# Patient Record
Sex: FEMALE | Race: WHITE | NOT HISPANIC OR LATINO | Employment: OTHER | URBAN - METROPOLITAN AREA
[De-identification: names, ages, dates, MRNs, and addresses within clinical notes are randomized per-mention and may not be internally consistent; named-entity substitution may affect disease eponyms.]

---

## 2023-09-26 DIAGNOSIS — N18.32 CHRONIC KIDNEY DISEASE, STAGE 3B: Primary | ICD-10-CM

## 2023-09-26 DIAGNOSIS — N18.32 STAGE 3B CHRONIC KIDNEY DISEASE: Primary | ICD-10-CM

## 2023-09-26 RX ORDER — ESTRADIOL 2 MG/1
2 TABLET ORAL DAILY
COMMUNITY
Start: 2023-08-23

## 2023-09-26 RX ORDER — TIZANIDINE 4 MG/1
TABLET ORAL 2 TIMES DAILY
COMMUNITY
Start: 2023-09-19

## 2023-09-26 RX ORDER — OXYCODONE HYDROCHLORIDE 20 MG/1
20 TABLET ORAL
COMMUNITY
Start: 2023-09-19

## 2023-09-26 RX ORDER — ONDANSETRON 4 MG/1
4 TABLET, ORALLY DISINTEGRATING ORAL EVERY 6 HOURS PRN
COMMUNITY
Start: 2023-09-15

## 2023-09-26 RX ORDER — LINACLOTIDE 290 UG/1
290 CAPSULE, GELATIN COATED ORAL
COMMUNITY
Start: 2023-09-19

## 2023-09-26 RX ORDER — PREDNISONE 20 MG/1
20 TABLET ORAL
COMMUNITY
Start: 2023-09-15

## 2023-09-29 RX ORDER — NITROGLYCERIN 0.4 MG/1
0.4 TABLET SUBLINGUAL
COMMUNITY
Start: 2023-08-24

## 2023-09-29 RX ORDER — AMLODIPINE BESYLATE 10 MG/1
10 TABLET ORAL DAILY
COMMUNITY
Start: 2023-09-12

## 2023-09-29 RX ORDER — ALLOPURINOL 100 MG/1
100 TABLET ORAL DAILY
COMMUNITY
Start: 2023-05-23

## 2023-09-29 RX ORDER — DICLOFENAC SODIUM 10 MG/G
2 GEL TOPICAL
COMMUNITY
Start: 2023-09-19

## 2023-09-29 RX ORDER — EVOLOCUMAB 140 MG/ML
1 INJECTION, SOLUTION SUBCUTANEOUS
COMMUNITY
Start: 2023-08-25

## 2023-09-29 RX ORDER — CARVEDILOL 3.12 MG/1
3.12 TABLET ORAL 2 TIMES DAILY
COMMUNITY
Start: 2023-04-14

## 2023-09-29 RX ORDER — VALACYCLOVIR HYDROCHLORIDE 500 MG/1
500 TABLET, FILM COATED ORAL DAILY
COMMUNITY
Start: 2023-05-05

## 2023-09-29 RX ORDER — PRASUGREL 10 MG/1
10 TABLET, FILM COATED ORAL DAILY
COMMUNITY
Start: 2023-09-02

## 2023-09-29 NOTE — PROGRESS NOTES
"Pt Name:  Katelin Barajas  Pt :  1965  Pt MRN:  [id number]    Date: 10/3/2023    Reason for visit:   Katelin Barajas is a 58 y.o. c female presenting for initial evaluation of CKD with serum creatinine 1.37, eGFR 45 and Chronic Kidney Disease Stage 3a.     Chief Complaint:   The patient denies any complaints today.    HPI:  The patient is being seen today for initial evaluation of CKD  and was referred by Dr. Christie.  The patient reports 25 + year history of Hypertension.  She does not have diabetes. She had a nephrectomy due to "stagnant urine growing around the kidney - encapsulated". She had the nephrectomy - right - at age 18.  She has been followed by Dr. Cabrales, Nephrology in [place] for approximately the past 10 years and wanted to be seen closer to home. The patient denies weakness, poor appetite, metallic taste, cramping, chest pain, palpitations, orthopnea, PND, trouble concentrating.  She c/o fatigue, edema, nausea, SOB - with exertion, decreased urination.  She took her  husbands Lasix 40 mg daily for the last 3 days due to lower extremity edema.     She does not monitor her Bps at home. The patient is following a low sodium diet. The patient is avoiding NSAIDs. The patient is drinking 64 - 72 oz of water daily.       History:   Past Medical History:   Diagnosis Date    Arthritis     Breast nodule     Chronic renal failure     LISE (generalized anxiety disorder)     Hypertension     Hypothyroidism, unspecified     Mixed hyperlipidemia     Solitary kidney, acquired      Past Surgical History:   Procedure Laterality Date    BLADDER SURGERY      CERVICAL FUSION      x2    HIP REPLACEMENT ARTHROPLASTY Bilateral     KIDNEY SURGERY      LUMBAR FUSION      x3    LUMBAR LAMINECTOMY      right nephrectomy      TOTAL ABDOMINAL HYSTERECTOMY W/ BILATERAL SALPINGOOPHORECTOMY      TOTAL SHOULDER ARTHROPLASTY Bilateral      History reviewed. No pertinent family history.  Social History " "    Substance and Sexual Activity   Alcohol Use Never     Social History     Substance and Sexual Activity   Drug Use Never     Social History     Substance and Sexual Activity   Sexual Activity Not Currently     reports that she is not currently sexually active.  Social History     Tobacco Use   Smoking Status Every Day    Types: Cigarettes   Smokeless Tobacco Never       Allergies:  Review of patient's allergies indicates:   Allergen Reactions    Coumadin [warfarin]     Lisinopril Other (See Comments)     Cough     Plavix [clopidogrel]     Adhesive Itching and Rash     "blisters under Tegaderm"          Current Outpatient Medications:     allopurinoL (ZYLOPRIM) 100 MG tablet, Take 100 mg by mouth once daily., Disp: , Rfl:     amLODIPine (NORVASC) 10 MG tablet, Take 10 mg by mouth once daily., Disp: , Rfl:     carvediloL (COREG) 3.125 MG tablet, Take 3.125 mg by mouth 2 (two) times daily., Disp: , Rfl:     diclofenac sodium (VOLTAREN) 1 % Gel, Apply 2 g topically as needed., Disp: , Rfl:     estradioL (ESTRACE) 2 MG tablet, Take 2 mg by mouth once daily., Disp: , Rfl:     LINZESS 290 mcg Cap capsule, Take 290 mcg by mouth as needed., Disp: , Rfl:     nitroGLYCERIN (NITROSTAT) 0.4 MG SL tablet, Place 0.4 mg under the tongue as needed., Disp: , Rfl:     ondansetron (ZOFRAN-ODT) 4 MG TbDL, Take 4 mg by mouth every 6 (six) hours as needed., Disp: , Rfl:     oxyCODONE (ROXICODONE) 20 mg Tab immediate release tablet, Take 20 mg by mouth as needed., Disp: , Rfl:     prasugreL (EFFIENT) 10 mg Tab, Take 10 mg by mouth once daily., Disp: , Rfl:     predniSONE (DELTASONE) 20 MG tablet, Take 20 mg by mouth as needed., Disp: , Rfl:     REPATHA SYRINGE 140 mg/mL Syrg, Inject 1 mL into the skin every 30 days., Disp: , Rfl:     tiZANidine (ZANAFLEX) 4 MG tablet, Take by mouth 2 (two) times daily., Disp: , Rfl:     valACYclovir (VALTREX) 500 MG tablet, Take 500 mg by mouth once daily., Disp: , Rfl:     cholecalciferol, vitamin D3, " "(VITAMIN D3) 25 mcg (1,000 unit) capsule, Take 5 capsules (5,000 Units total) by mouth once daily., Disp: 90 capsule, Rfl: 3    empagliflozin (JARDIANCE) 10 mg tablet, Take 1 tablet (10 mg total) by mouth once daily., Disp: 90 tablet, Rfl: 3    furosemide (LASIX) 40 MG tablet, Take 1 tablet (40 mg total) by mouth once daily., Disp: 90 tablet, Rfl: 3    [START ON 10/4/2023] patiromer calcium sorbitex (VELTASSA) 8.4 gram PwPk, Take 1 packet (8.4 g total) by mouth every Mon, Wed, Fri., Disp: 15 packet, Rfl: 11    valsartan (DIOVAN) 40 MG tablet, Take 0.5 tablets (20 mg total) by mouth once daily., Disp: 45 tablet, Rfl: 3    ROS:  Review of Systems   Constitutional:  Positive for fatigue. Negative for activity change and appetite change.   HENT:  Negative for hearing loss.    Eyes:  Negative for visual disturbance.   Respiratory:  Positive for shortness of breath. Negative for wheezing.    Cardiovascular:  Positive for leg swelling. Negative for chest pain.   Gastrointestinal:  Positive for nausea. Negative for abdominal pain, diarrhea and vomiting.   Genitourinary:  Positive for decreased urine volume. Negative for dysuria, flank pain, frequency and urgency.   Neurological:  Negative for dizziness, weakness and headaches.       Physical Exam:   Vitals:   Vitals:    10/03/23 1001   BP: 129/86   Pulse: 68   SpO2: 99%   Weight: 68 kg (150 lb)   Height: 5' 3" (1.6 m)     Body mass index is 26.57 kg/m².    Physical Exam  Vitals reviewed.   Constitutional:       General: She is not in acute distress.     Appearance: Normal appearance.   HENT:      Head: Normocephalic and atraumatic.      Mouth/Throat:      Mouth: Mucous membranes are moist.   Eyes:      Extraocular Movements: Extraocular movements intact.      Pupils: Pupils are equal, round, and reactive to light.   Cardiovascular:      Rate and Rhythm: Normal rate and regular rhythm.      Heart sounds: No murmur heard.  Pulmonary:      Effort: Pulmonary effort is normal. "      Breath sounds: No wheezing, rhonchi or rales.   Musculoskeletal:         General: No swelling.      Right lower le+ Pitting Edema present.      Left lower le+ Pitting Edema present.   Skin:     General: Skin is warm and dry.   Neurological:      Mental Status: She is alert and oriented to person, place, and time.   Psychiatric:         Mood and Affect: Mood normal.         Behavior: Behavior normal.       Labs/Tests 10/2/23:      K 5.1  Cl 107  C02 26  Glu 81  BUN 33  Creat 1.27  CA 9.6  Phos 2.8  Alb 4.2        Hgb 11.5    Vit D 18.2    Urine Prot/Creat  800 mg     Renal US 10/2/23:  Impression:   An isoechoic 10 mm nodule is noted adherent to the posterior bladder wall on the left side of the UVJ level.  No hydronephrosis.  Neoplasm is not excluded.       Diagnosis:  Plan and Assessment:  1. Hypertension with impaired renal function  Assessment & Plan:  At goal, Monitor BP twice daily for 2 weeks and return readings, 2 Gram NA diet, Continue Coreg 3.125 mg po BID, Decrease Amlodipine to 10 mg po daily, Start Valsartan 40 mg (1/2 type / 20 mg) po daily, Start lasix 40 mg po daily (took a dose of her  husbands for 3 days prior to visit due to Lower extremity edema).     Orders:  -     furosemide (LASIX) 40 MG tablet; Take 1 tablet (40 mg total) by mouth once daily.  Dispense: 90 tablet; Refill: 3  -     Renal Function Panel; Future; Expected date: 2024    2. Solitary kidney, acquired  Overview:  Right Nephrectomy     Assessment & Plan:  Solitary kidney -  Right nephrectomy at age 18     Orders:  -     valsartan (DIOVAN) 40 MG tablet; Take 0.5 tablets (20 mg total) by mouth once daily.  Dispense: 45 tablet; Refill: 3  -     patiromer calcium sorbitex (VELTASSA) 8.4 gram PwPk; Take 1 packet (8.4 g total) by mouth every Mon, Wed, Fri.  Dispense: 15 packet; Refill: 11  -     empagliflozin (JARDIANCE) 10 mg tablet; Take 1 tablet (10 mg total) by mouth once daily.  Dispense: 90  tablet; Refill: 3  -     Renal Function Panel; Future; Expected date: 02/02/2024    3. Stage 3a chronic kidney disease  Assessment & Plan:  Serum Creatinine 1.37 / eGFR 45; - without Anemia -  Avoid NSAIDS, Assure 72 oz of water daily, Meds per med list above     Have discussed futility of renal biopsy and she declines at this time.     She has not been on an ACE (allergy) or ARB - will start ARB and SGLT2     Orders:  -     valsartan (DIOVAN) 40 MG tablet; Take 0.5 tablets (20 mg total) by mouth once daily.  Dispense: 45 tablet; Refill: 3  -     furosemide (LASIX) 40 MG tablet; Take 1 tablet (40 mg total) by mouth once daily.  Dispense: 90 tablet; Refill: 3  -     patiromer calcium sorbitex (VELTASSA) 8.4 gram PwPk; Take 1 packet (8.4 g total) by mouth every Mon, Wed, Fri.  Dispense: 15 packet; Refill: 11  -     empagliflozin (JARDIANCE) 10 mg tablet; Take 1 tablet (10 mg total) by mouth once daily.  Dispense: 90 tablet; Refill: 3  -     Ambulatory referral/consult to Urology; Future; Expected date: 10/10/2023  -     Renal Function Panel; Future; Expected date: 10/17/2023  -     Hemoglobin; Future; Expected date: 02/02/2024  -     Vitamin D; Future; Expected date: 02/02/2024  -     Renal Function Panel; Future; Expected date: 02/02/2024  -     PTH, Intact; Future; Expected date: 02/02/2024  -     Protein/Creatinine Ratio, Urine; Future; Expected date: 02/02/2024  -     Magnesium; Future; Expected date: 10/17/2023    4. Secondary hyperparathyroidism of renal origin  Assessment & Plan:   - No indication for specialized Vitamin D at this time.     Orders:  -     PTH, Intact; Future; Expected date: 02/02/2024    5. Non-nephrotic range proteinuria  Assessment & Plan:  800 mg - start ARB and SGLT2    Orders:  -     valsartan (DIOVAN) 40 MG tablet; Take 0.5 tablets (20 mg total) by mouth once daily.  Dispense: 45 tablet; Refill: 3  -     empagliflozin (JARDIANCE) 10 mg tablet; Take 1 tablet (10 mg total) by mouth  once daily.  Dispense: 90 tablet; Refill: 3  -     Protein/Creatinine Ratio, Urine; Future; Expected date: 02/02/2024    6. Mixed hyperlipidemia  Assessment & Plan:  Continue Repatha monthly       7. Vitamin D deficiency, unspecified  Assessment & Plan:  Vitamin D 18.2 - start Vitamin D3 5000 units po daily     Calcium 9.6 / albumin 4.2     Orders:  -     cholecalciferol, vitamin D3, (VITAMIN D3) 25 mcg (1,000 unit) capsule; Take 5 capsules (5,000 Units total) by mouth once daily.  Dispense: 90 capsule; Refill: 3  -     Vitamin D; Future; Expected date: 02/02/2024    8. Coronary artery disease involving native coronary artery of native heart without angina pectoris  Overview:  CAD with 4 stents     Assessment & Plan:  Managed by Dr. Payton       9. Hyperkalemia  Assessment & Plan:  K 5.1 - Continue Veltassa 8.4 grams po M-W-F     Repeat Labs in 2 weeks (adding ARB and SGLT2)     Orders:  -     patiromer calcium sorbitex (VELTASSA) 8.4 gram PwPk; Take 1 packet (8.4 g total) by mouth every Mon, Wed, Fri.  Dispense: 15 packet; Refill: 11  -     Renal Function Panel; Future; Expected date: 10/17/2023  -     Renal Function Panel; Future; Expected date: 02/02/2024  -     Magnesium; Future; Expected date: 10/17/2023    10. Edema of both lower extremities  Assessment & Plan:  2 + bilateral lower extremities with 800 mg proteinuria - start ARB and SGLT2     Orders:  -     valsartan (DIOVAN) 40 MG tablet; Take 0.5 tablets (20 mg total) by mouth once daily.  Dispense: 45 tablet; Refill: 3  -     furosemide (LASIX) 40 MG tablet; Take 1 tablet (40 mg total) by mouth once daily.  Dispense: 90 tablet; Refill: 3    11. Abnormal finding on diagnostic imaging of kidney  Overview:  Renal US 10/2/23:  Impression:   An isoechoic 10 mm nodule is noted adherent to the posterior bladder wall on the left side of the UVJ level.  No hydronephrosis.  Neoplasm is not excluded.     Assessment & Plan:  She has been followed by Dr. Camilo Gayle  but desires to change to someone more local.  Will refer to Dr. Emigdio Talley.              My reconciliation of medication should not condone or support use of medications that have been previously prescribed for patients by other providers.  I am only documenting the current medications patients is taking and may change doses, add or discontinue medications based on information provided by the patient.     The patient has been provided with their current level of kidney function including eGFR and creatinine.    We discussed the potential for common complications of CKD including anemia, electrolyte abnormalities, abnormal fluid balance, mineral bone disease and malnutrition.    We discussed strategies to slow the progression of their kidney disease including:  Avoid nephrotoxic agents. Avoid over-the-counter and prescription NSAIDs (Ibuprofren, Motrin, Naproxyn, Aleve, Mobic, Celebrex, Toradol, Advil). All of these can worsen kidney function, elevate BP, cause fluid retention/swelling and elevate potassium. Avoid iodine contrast agents and gadolinium, which can worsen kidney function and/or cause kidney failure. Avoid phosphosoda bowel preps which can worsen kidney function.  Work to improve modifiable risk factors. Aim for good control of blood glucose without episodes of hypoglycemia. Notify the provider managing your diabetes if your blood glucose < 60. Aim for good blood pressure control without episodes of hypotension. Call the office if your systolic blood pressure is consistently < 110. Aim for good control of your cholesterol.  AIC goal <7.0  BP goal <130/80        Keeping these in goal range will help prevent progression of cardiovascular disease            and chronic kidney disease.    We discussed dietary modifications:  Low sodium diet: 2 gm/d or less  Limit/avoid high potassium foods  Avoid potassium containing salt substitutes  Limit/avoid high phosphorus foods  Limit daily protein intake to 0.8-1  gm/kg of your ideal body weight.    We discussed lifestyle modifications:  Make sure you are drinking plenty of fluids--64 ounces (1/2 gallon) daily  Exercise at least 30 minutes 5 x per week (total 150 minutes per week), example brisk walking  Achieve and maintain a healthy weight (BMI 20-25)  Limit alcohol consumption to <2 drinks per day  Stop smoking  Make sure you stay current on important vaccines-- pneumonia vaccines (Pneumovax and Prevnar), flu vaccine, Hepatitis B (especially patients nearing renal replacement therapy and planning hemodialysis) and Covid-19 vaccine.     Recommendations:  Monitor your BP at home daily and record.  Bring readings to your next appt.  Call the office if your BP is persistently >130/80.  Seek urgent medical attention with signs and symptoms of uremia - extreme weakness, fatigue, confusion, anorexia, metallic taste in mouth, hiccoughs, cramping, itching, chest pain, swelling, or trouble sleeping.    Follow Up:   Follow up in about 4 months (around 2/3/2024).

## 2023-10-03 ENCOUNTER — OFFICE VISIT (OUTPATIENT)
Dept: NEPHROLOGY | Facility: CLINIC | Age: 58
End: 2023-10-03
Payer: MEDICARE

## 2023-10-03 VITALS
WEIGHT: 150 LBS | BODY MASS INDEX: 26.58 KG/M2 | OXYGEN SATURATION: 99 % | DIASTOLIC BLOOD PRESSURE: 86 MMHG | HEIGHT: 63 IN | SYSTOLIC BLOOD PRESSURE: 129 MMHG | HEART RATE: 68 BPM

## 2023-10-03 DIAGNOSIS — E87.5 HYPERKALEMIA: ICD-10-CM

## 2023-10-03 DIAGNOSIS — Z90.5 SOLITARY KIDNEY, ACQUIRED: ICD-10-CM

## 2023-10-03 DIAGNOSIS — N25.81 SECONDARY HYPERPARATHYROIDISM OF RENAL ORIGIN: ICD-10-CM

## 2023-10-03 DIAGNOSIS — I12.9 HYPERTENSION WITH IMPAIRED RENAL FUNCTION: ICD-10-CM

## 2023-10-03 DIAGNOSIS — N18.31 STAGE 3A CHRONIC KIDNEY DISEASE: ICD-10-CM

## 2023-10-03 DIAGNOSIS — R60.0 EDEMA OF BOTH LOWER EXTREMITIES: ICD-10-CM

## 2023-10-03 DIAGNOSIS — R80.9 NON-NEPHROTIC RANGE PROTEINURIA: ICD-10-CM

## 2023-10-03 DIAGNOSIS — E78.2 MIXED HYPERLIPIDEMIA: ICD-10-CM

## 2023-10-03 DIAGNOSIS — I25.10 CORONARY ARTERY DISEASE INVOLVING NATIVE CORONARY ARTERY OF NATIVE HEART WITHOUT ANGINA PECTORIS: ICD-10-CM

## 2023-10-03 DIAGNOSIS — R93.429 ABNORMAL FINDING ON DIAGNOSTIC IMAGING OF KIDNEY: ICD-10-CM

## 2023-10-03 DIAGNOSIS — E55.9 VITAMIN D DEFICIENCY, UNSPECIFIED: ICD-10-CM

## 2023-10-03 PROCEDURE — 3066F NEPHROPATHY DOC TX: CPT | Mod: CPTII,S$GLB,, | Performed by: NURSE PRACTITIONER

## 2023-10-03 PROCEDURE — 3074F SYST BP LT 130 MM HG: CPT | Mod: CPTII,S$GLB,, | Performed by: NURSE PRACTITIONER

## 2023-10-03 PROCEDURE — 1160F PR REVIEW ALL MEDS BY PRESCRIBER/CLIN PHARMACIST DOCUMENTED: ICD-10-PCS | Mod: CPTII,S$GLB,, | Performed by: NURSE PRACTITIONER

## 2023-10-03 PROCEDURE — 1160F RVW MEDS BY RX/DR IN RCRD: CPT | Mod: CPTII,S$GLB,, | Performed by: NURSE PRACTITIONER

## 2023-10-03 PROCEDURE — 3079F DIAST BP 80-89 MM HG: CPT | Mod: CPTII,S$GLB,, | Performed by: NURSE PRACTITIONER

## 2023-10-03 PROCEDURE — 3079F PR MOST RECENT DIASTOLIC BLOOD PRESSURE 80-89 MM HG: ICD-10-PCS | Mod: CPTII,S$GLB,, | Performed by: NURSE PRACTITIONER

## 2023-10-03 PROCEDURE — 3066F PR DOCUMENTATION OF TREATMENT FOR NEPHROPATHY: ICD-10-PCS | Mod: CPTII,S$GLB,, | Performed by: NURSE PRACTITIONER

## 2023-10-03 PROCEDURE — 4010F ACE/ARB THERAPY RXD/TAKEN: CPT | Mod: CPTII,S$GLB,, | Performed by: NURSE PRACTITIONER

## 2023-10-03 PROCEDURE — 1159F MED LIST DOCD IN RCRD: CPT | Mod: CPTII,S$GLB,, | Performed by: NURSE PRACTITIONER

## 2023-10-03 PROCEDURE — 99204 OFFICE O/P NEW MOD 45 MIN: CPT | Mod: S$GLB,,, | Performed by: NURSE PRACTITIONER

## 2023-10-03 PROCEDURE — 3008F PR BODY MASS INDEX (BMI) DOCUMENTED: ICD-10-PCS | Mod: CPTII,S$GLB,, | Performed by: NURSE PRACTITIONER

## 2023-10-03 PROCEDURE — 1159F PR MEDICATION LIST DOCUMENTED IN MEDICAL RECORD: ICD-10-PCS | Mod: CPTII,S$GLB,, | Performed by: NURSE PRACTITIONER

## 2023-10-03 PROCEDURE — 4010F PR ACE/ARB THEARPY RXD/TAKEN: ICD-10-PCS | Mod: CPTII,S$GLB,, | Performed by: NURSE PRACTITIONER

## 2023-10-03 PROCEDURE — 99204 PR OFFICE/OUTPT VISIT, NEW, LEVL IV, 45-59 MIN: ICD-10-PCS | Mod: S$GLB,,, | Performed by: NURSE PRACTITIONER

## 2023-10-03 PROCEDURE — 3008F BODY MASS INDEX DOCD: CPT | Mod: CPTII,S$GLB,, | Performed by: NURSE PRACTITIONER

## 2023-10-03 PROCEDURE — 3074F PR MOST RECENT SYSTOLIC BLOOD PRESSURE < 130 MM HG: ICD-10-PCS | Mod: CPTII,S$GLB,, | Performed by: NURSE PRACTITIONER

## 2023-10-03 RX ORDER — FUROSEMIDE 40 MG/1
40 TABLET ORAL DAILY
Qty: 90 TABLET | Refills: 3 | Status: SHIPPED | OUTPATIENT
Start: 2023-10-03

## 2023-10-03 RX ORDER — VIT C/E/ZN/COPPR/LUTEIN/ZEAXAN 250MG-90MG
5000 CAPSULE ORAL DAILY
Qty: 90 CAPSULE | Refills: 3
Start: 2023-10-03

## 2023-10-03 RX ORDER — FUROSEMIDE 40 MG/1
40 TABLET ORAL DAILY
COMMUNITY
End: 2023-10-03 | Stop reason: SDUPTHER

## 2023-10-03 RX ORDER — VALSARTAN 40 MG/1
20 TABLET ORAL DAILY
Qty: 45 TABLET | Refills: 3 | Status: SHIPPED | OUTPATIENT
Start: 2023-10-03

## 2023-10-03 NOTE — ASSESSMENT & PLAN NOTE
Serum Creatinine 1.37 / eGFR 45; - without Anemia -  Avoid NSAIDS, Assure 72 oz of water daily, Meds per med list above     Have discussed futility of renal biopsy and she declines at this time.     She has not been on an ACE (allergy) or ARB - will start ARB and SGLT2

## 2023-10-03 NOTE — ASSESSMENT & PLAN NOTE
She has been followed by Dr. Camilo Gayle but desires to change to someone more local.  Will refer to Dr. Emigdio Matson

## 2023-10-03 NOTE — ASSESSMENT & PLAN NOTE
At goal, Monitor BP twice daily for 2 weeks and return readings, 2 Gram NA diet, Continue Coreg 3.125 mg po BID, Decrease Amlodipine to 10 mg po daily, Start Valsartan 40 mg (1/2 type / 20 mg) po daily, Start lasix 40 mg po daily (took a dose of her  husbands for 3 days prior to visit due to Lower extremity edema).

## 2023-10-03 NOTE — PATIENT INSTRUCTIONS
MEDICATIONS TO AVOID     ALL NON-STEROIDAL ANTI-INFLAMMATORY DRUGS (NSAID'S)    CELEBREX      ANAPROX  LODINE      TORADOL  NUPRIN      IBUPROFEN  NAPROSYN      MOBIC  ADVIL       ALEVE  VOLTAREN      MOTRIN  INDOMETHACIN (INDOCIN)    ECOTRIN  DAYPRO      FELDENE    TYLENOL, ACETAMINOPHEN, OR PRESCRIPTION PAIN MEDICATION (WITH THE EXCEPTION OF ABOVE) IS SAFE FOR KIDNEY PATIENTS    Please DO NOT take any of these medications without first discussing with your Doctor or Nurse Practitioner      The patient has been provided with their current level of kidney function including eGFR and creatinine.    We discussed the potential for common complications of CKD including anemia, electrolyte abnormalities, abnormal fluid balance, mineral bone disease and malnutrition.    We discussed strategies to slow the progression of their kidney disease including:  Avoid nephrotoxic agents. Avoid over-the-counter and prescription NSAIDs (Ibuprofren, Motrin, Naproxyn, Aleve, Mobic, Celebrex, Toradol, Advil). All of these can worsen kidney function, elevate BP, cause fluid retention/swelling and elevate potassium. Avoid iodine contrast agents and gadolinium, which can worsen kidney function and/or cause kidney failure. Avoid phosphosoda bowel preps which can worsen kidney function.  Work to improve modifiable risk factors. Aim for good control of blood glucose without episodes of hypoglycemia. Notify the provider managing your diabetes if your blood glucose < 60. Aim for good blood pressure control without episodes of hypotension. Call the office if your systolic blood pressure is consistently < 110. Aim for good control of your cholesterol.  AIC goal <7.0  BP goal <130/80        Keeping these in goal range will help prevent progression of cardiovascular disease            and chronic kidney disease.    We discussed dietary modifications:  Low sodium diet: 2 gm/d or less  Limit/avoid high potassium foods  Avoid potassium  containing salt substitutes  Limit/avoid high phosphorus foods  Limit daily protein intake to 0.8-1 gm/kg of your ideal body weight.    We discussed lifestyle modifications:  Make sure you are drinking plenty of fluids--72 ounces daily  Exercise at least 30 minutes 5 x per week (total 150 minutes per week), example brisk walking  Achieve and maintain a healthy weight (BMI 20-25)  Limit alcohol consumption to <2 drinks per day  Stop smoking  Make sure you stay current on important vaccines-- pneumonia vaccines (Pneumovax and Prevnar), flu vaccine, Hepatitis B (especially patients nearing renal replacement therapy and planning hemodialysis) and Covid-19 vaccine.     Recommendations:  Monitor your BP at home daily and record.  Bring readings to your next appt.  Call the office if your BP is persistently >130/80.  Seek urgent medical attention with signs and symptoms of uremia - extreme weakness, fatigue, confusion, anorexia, metallic taste in mouth, hiccoughs, cramping, itching, chest pain, swelling, or trouble sleeping.

## 2023-10-25 ENCOUNTER — DOCUMENTATION ONLY (OUTPATIENT)
Dept: NEPHROLOGY | Facility: CLINIC | Age: 58
End: 2023-10-25
Payer: MEDICARE

## 2023-10-25 NOTE — PROGRESS NOTES
Received BP readings and Bps are at goal.  Patient notified to continue same BP regimen and she voices understanding.